# Patient Record
Sex: FEMALE | Race: WHITE | NOT HISPANIC OR LATINO | URBAN - METROPOLITAN AREA
[De-identification: names, ages, dates, MRNs, and addresses within clinical notes are randomized per-mention and may not be internally consistent; named-entity substitution may affect disease eponyms.]

---

## 2024-11-13 ENCOUNTER — OFFICE VISIT (OUTPATIENT)
Dept: URGENT CARE | Facility: CLINIC | Age: 24
End: 2024-11-13
Payer: OTHER GOVERNMENT

## 2024-11-13 VITALS
OXYGEN SATURATION: 98 % | RESPIRATION RATE: 18 BRPM | BODY MASS INDEX: 24.41 KG/M2 | HEART RATE: 85 BPM | TEMPERATURE: 98.4 F | WEIGHT: 143 LBS | HEIGHT: 64 IN | SYSTOLIC BLOOD PRESSURE: 114 MMHG | DIASTOLIC BLOOD PRESSURE: 68 MMHG

## 2024-11-13 DIAGNOSIS — R11.0 NAUSEA: ICD-10-CM

## 2024-11-13 PROCEDURE — 3078F DIAST BP <80 MM HG: CPT

## 2024-11-13 PROCEDURE — 99203 OFFICE O/P NEW LOW 30 MIN: CPT

## 2024-11-13 PROCEDURE — 3074F SYST BP LT 130 MM HG: CPT

## 2024-11-13 RX ORDER — OLANZAPINE 5 MG/1
TABLET ORAL
COMMUNITY
Start: 2024-11-11

## 2024-11-13 RX ORDER — NORETHINDRONE ACETATE AND ETHINYL ESTRADIOL 1MG-20(21)
1 KIT ORAL DAILY
COMMUNITY
Start: 2024-10-28 | End: 2025-01-26

## 2024-11-13 RX ORDER — ONDANSETRON 4 MG/1
4 TABLET, ORALLY DISINTEGRATING ORAL EVERY 6 HOURS PRN
Qty: 15 TABLET | Refills: 0 | Status: SHIPPED | OUTPATIENT
Start: 2024-11-13

## 2024-11-13 RX ORDER — ARIPIPRAZOLE 20 MG/1
TABLET ORAL
COMMUNITY
Start: 2024-09-22

## 2024-11-13 RX ORDER — DULOXETIN HYDROCHLORIDE 30 MG/1
CAPSULE, DELAYED RELEASE ORAL
COMMUNITY
Start: 2024-08-20

## 2024-11-13 RX ORDER — PROPRANOLOL HYDROCHLORIDE 10 MG/1
10 TABLET ORAL 3 TIMES DAILY PRN
COMMUNITY
Start: 2024-10-15

## 2024-11-13 ASSESSMENT — ENCOUNTER SYMPTOMS
FEVER: 0
CHILLS: 0
NAUSEA: 1
DIARRHEA: 0
ABDOMINAL PAIN: 0
CONSTIPATION: 0
VOMITING: 1

## 2024-11-13 NOTE — PROGRESS NOTES
CHIEF COMPLAINT  Chief Complaint   Patient presents with    Emesis     X1 year: When taking medication feels nauseous and then throws up. Has been getting worse. Bad gag reflex.      Subjective:   Siomara Johansen is a 24 y.o. female who presents to urgent care with concerns for emesis on and off x 1 year.  Patient reports that emesis occurs approximately 15 minutes after taking medications as prescribed by psychiatry.  She reports that she has been on medications for over a year, but recently started experiencing symptoms of nausea after taking mediations 1 year ago.  She denies any recent changes to medications.  Patient denies any symptoms of abdominal pain.  No fevers or chills.  No constipation or diarrhea.  She reports episodes of emesis only occur after taking scheduled medications.  She reports symptoms of nausea and vomiting occur approximately 50% of the time.. She denies any symptoms of GERD or reflux. No other pertinent past medical history.       Review of Systems   Constitutional:  Negative for chills and fever.   Gastrointestinal:  Positive for nausea and vomiting. Negative for abdominal pain, constipation and diarrhea.       PAST MEDICAL HISTORY  There are no active problems to display for this patient.      SURGICAL HISTORY  patient denies any surgical history    ALLERGIES  No Known Allergies    CURRENT MEDICATIONS  Home Medications       Reviewed by Billy Siddiqui Ass't (Medical Assistant) on 11/13/24 at 1417  Med List Status: <None>     Medication Last Dose Status   aripiprazole (ABILIFY) 20 MG tablet Taking Active   BLISOVI FE 1/20 1-20 MG-MCG per tablet Taking Active   DULoxetine (CYMBALTA) 30 MG Cap DR Particles Taking Active   OLANZapine (ZYPREXA) 5 MG Tab Taking Active   propranolol (INDERAL) 10 MG Tab Taking Active                    SOCIAL HISTORY  Social History     Tobacco Use    Smoking status: Never    Smokeless tobacco: Never   Vaping Use    Vaping status: Never Used   Substance  "and Sexual Activity    Alcohol use: Yes     Comment: occ    Drug use: Not Currently    Sexual activity: Not on file       FAMILY HISTORY  No family history on file.      Medications, Allergies, and current problem list reviewed today in Epic.     Objective:     /68   Pulse 85   Temp 36.9 °C (98.4 °F)   Resp 18   Ht 1.626 m (5' 4\")   Wt 64.9 kg (143 lb)   SpO2 98%     Physical Exam  Vitals reviewed.   Constitutional:       General: She is not in acute distress.     Appearance: Normal appearance. She is not ill-appearing or toxic-appearing.   HENT:      Head: Normocephalic.   Cardiovascular:      Rate and Rhythm: Normal rate.      Pulses: Normal pulses.   Pulmonary:      Effort: Pulmonary effort is normal.   Musculoskeletal:      Cervical back: Normal range of motion.   Skin:     General: Skin is warm.      Capillary Refill: Capillary refill takes less than 2 seconds.   Neurological:      General: No focal deficit present.      Mental Status: She is alert.   Psychiatric:         Mood and Affect: Mood normal.         Assessment/Plan:     Diagnosis and associated orders:     1. Nausea  ondansetron (ZOFRAN ODT) 4 MG TABLET DISPERSIBLE         Comments/MDM:     Advised patient on likely etiology side effects related to medication.  Counseled patient to make sure she is taking medication on full stomach.  Advised patient to follow-up with prescribing provider to discuss alternate therapies or scheduling to aid in tolerability.  Discussed keeping log of symptoms, in order to track any patterns for follow-up appointment.  Prescription of Zofran sent to preferred pharmacy for treatment of acute nausea.  Return to clinic if symptoms worsen or become concerning.         Differential diagnosis, natural history, supportive care, and indications for immediate follow-up discussed.    Advised the patient to follow-up with the primary care physician for recheck, reevaluation, and consideration of further " management.    Please note that this dictation was created using voice recognition software. I have made a reasonable attempt to correct obvious errors, but I expect that there are errors of grammar and possibly content that I did not discover before finalizing the note.    This note was electronically signed by ANTONIO Juarez

## 2025-04-24 NOTE — Clinical Note
REFERRAL APPROVAL NOTICE         Sent on April 24, 2025                   Siomara Naikyce  [address]  [address]  [facility] [address]                   Dear MsSuleman Haily,    After a careful review of the medical information and benefit coverage, [facility] has processed your referral. See below for additional details.    If applicable, you must be actively enrolled with your insurance for coverage of the authorized service. If you have any questions regarding your coverage, please contact your insurance directly.    REFERRAL INFORMATION   Referral #:  [id number]  Referred-To Department    Referred-By Provider:  Behavioral Health    ANTONIO Vences   Behavioral Health Outpatient      [address] [facility] [address]  [phone] [address] [place] [address] [address]  [facility] [address] [phone]    Referral Start Date:  04/24/2025  Referral End Date:   06/30/2025             SCHEDULING  If you do not already have an appointment, please call [phone] to make an appointment.     MORE INFORMATION  If you do not already have a [facility] account, sign up at: [email or web address]  You can access your medical information, make appointments, see lab results, billing information, and more.  If you have questions regarding this referral, please contact  the [facility] Referrals department at:             [phone]. Monday - Friday 8:00AM - 5:00PM.     Sincerely,    [facility]

## 2025-06-18 ENCOUNTER — APPOINTMENT (OUTPATIENT)
Dept: BEHAVIORAL HEALTH | Facility: CLINIC | Age: 25
End: 2025-06-18
Payer: OTHER GOVERNMENT